# Patient Record
Sex: FEMALE | Race: BLACK OR AFRICAN AMERICAN | NOT HISPANIC OR LATINO | URBAN - METROPOLITAN AREA
[De-identification: names, ages, dates, MRNs, and addresses within clinical notes are randomized per-mention and may not be internally consistent; named-entity substitution may affect disease eponyms.]

---

## 2022-02-21 ENCOUNTER — OFFICE VISIT (OUTPATIENT)
Dept: SURGERY | Facility: CLINIC | Age: 47
End: 2022-02-21
Payer: MEDICAID

## 2022-02-21 VITALS — RESPIRATION RATE: 18 BRPM | WEIGHT: 250 LBS | BODY MASS INDEX: 39.24 KG/M2 | HEIGHT: 67 IN

## 2022-02-21 DIAGNOSIS — D17.21 LIPOMA OF RIGHT UPPER EXTREMITY: ICD-10-CM

## 2022-02-21 DIAGNOSIS — K43.6 VENTRAL HERNIA WITH OBSTRUCTION, WITHOUT GANGRENE: Primary | ICD-10-CM

## 2022-02-21 DIAGNOSIS — R23.0 CYANOSIS: ICD-10-CM

## 2022-02-21 PROCEDURE — 1159F MED LIST DOCD IN RCRD: CPT | Mod: CPTII,S$GLB,, | Performed by: SURGERY

## 2022-02-21 PROCEDURE — 1160F RVW MEDS BY RX/DR IN RCRD: CPT | Mod: CPTII,S$GLB,, | Performed by: SURGERY

## 2022-02-21 PROCEDURE — 99204 PR OFFICE/OUTPT VISIT, NEW, LEVL IV, 45-59 MIN: ICD-10-PCS | Mod: S$GLB,,, | Performed by: SURGERY

## 2022-02-21 PROCEDURE — 3008F BODY MASS INDEX DOCD: CPT | Mod: CPTII,S$GLB,, | Performed by: SURGERY

## 2022-02-21 PROCEDURE — 99204 OFFICE O/P NEW MOD 45 MIN: CPT | Mod: S$GLB,,, | Performed by: SURGERY

## 2022-02-21 PROCEDURE — 1159F PR MEDICATION LIST DOCUMENTED IN MEDICAL RECORD: ICD-10-PCS | Mod: CPTII,S$GLB,, | Performed by: SURGERY

## 2022-02-21 PROCEDURE — 3008F PR BODY MASS INDEX (BMI) DOCUMENTED: ICD-10-PCS | Mod: CPTII,S$GLB,, | Performed by: SURGERY

## 2022-02-21 PROCEDURE — 1160F PR REVIEW ALL MEDS BY PRESCRIBER/CLIN PHARMACIST DOCUMENTED: ICD-10-PCS | Mod: CPTII,S$GLB,, | Performed by: SURGERY

## 2022-02-21 NOTE — PROGRESS NOTES
Subjective:       Patient ID: Aranza Villatoro is a 46 y.o. female.    Chief Complaint: New Pt, R Forearm Mass, and Umbilical Hernia      46-year-old female with multiple complaints.  Patient has an umbilical hernia this started recently after doing some heavy lifting, she states she felt a pop and then a bulge.  Nothing making this better is only getting worse.    Patient is also complaining of a lump on the subcutaneous tissue right form, states has been getting bigger and causing some localized pain, like to have it removed.    Also complaining of left hand occasional numbness and tingling, as well as right hand numbness tingling and sometimes cyanosis    Review of Systems   Constitutional: Negative for chills and fever.   HENT: Negative for nasal congestion and rhinorrhea.    Eyes: Negative for discharge and visual disturbance.   Respiratory: Negative for shortness of breath and wheezing.    Cardiovascular: Negative for chest pain and palpitations.   Gastrointestinal: Negative.  Negative for abdominal distention, abdominal pain, anal bleeding, blood in stool, constipation, diarrhea, nausea, rectal pain and vomiting.   Endocrine: Negative for cold intolerance and heat intolerance.   Genitourinary: Negative for difficulty urinating and frequency.   Musculoskeletal: Negative for back pain and myalgias.   Integumentary:  Negative for pallor and rash.   Neurological: Negative for dizziness and headaches.   Hematological: Negative for adenopathy. Does not bruise/bleed easily.   Psychiatric/Behavioral: Negative for behavioral problems. The patient is not nervous/anxious.          Objective:      Physical Exam  Constitutional:       Appearance: Normal appearance. She is well-developed.   HENT:      Head: Normocephalic and atraumatic.   Eyes:      General: Lids are normal.      Conjunctiva/sclera: Conjunctivae normal.   Neck:      Trachea: Trachea normal.   Cardiovascular:      Rate and Rhythm: Normal rate and regular  rhythm.      Heart sounds: Normal heart sounds.   Pulmonary:      Effort: Pulmonary effort is normal.      Breath sounds: Normal breath sounds.   Abdominal:      General: Bowel sounds are normal. There is no distension.      Palpations: Abdomen is soft.      Tenderness: There is no abdominal tenderness.      Hernia: No hernia is present.          Comments: 2 cm reducible umbilical hernia   Musculoskeletal:      Cervical back: Normal range of motion.   Skin:     General: Skin is warm and dry.             Comments: 2 cm 2 cm right forearm lipoma   Neurological:      Mental Status: She is alert and oriented to person, place, and time.   Psychiatric:         Speech: Speech normal.         Behavior: Behavior normal. Behavior is cooperative.         Assessment:       Problem List Items Addressed This Visit    None     Visit Diagnoses     Ventral hernia with obstruction, without gangrene    -  Primary    Lipoma of right upper extremity              Plan:       46-year-old female with umbilical hernia, risks benefits robotic umbilical hernia repair discussed in detail, will be scheduled for surgery, at that time will also remove the lipoma the right arm.  Patient also having symptoms of possibly carpal tunnel bilaterally, having some cyanotic symptoms in her right hand which could be consistent with thoracic outlet syndrome.  Have the patient evaluated by the Orthopedics team.

## 2022-03-02 ENCOUNTER — TELEPHONE (OUTPATIENT)
Dept: SURGERY | Facility: CLINIC | Age: 47
End: 2022-03-02
Payer: MEDICAID

## 2022-03-02 NOTE — TELEPHONE ENCOUNTER
02-24-22 @ 12:04    FAXED (with conf) referral to Dr. Kd Hicks IV (ortho).    Dr. Kd Hicks IV  [facility]  P: [phone]  F: [phone]

## 2022-03-29 LAB
ABS NRBC COUNT: 0 THOU/UL (ref 0–0.01)
ABSOLUTE BASOPHIL: 0 10*3/UL (ref 0–0.3)
ABSOLUTE EOSINOPHIL: 0.3 10*3/UL (ref 0–0.6)
ABSOLUTE IMMATURE GRAN: 0.01 THOU/UL (ref 0–0.03)
ABSOLUTE LYMPHOCYTE: 2.3 10*3/UL (ref 1.2–4)
ABSOLUTE MONOCYTE: 0.3 10*3/UL (ref 0.1–0.8)
ANION GAP SERPL CALC-SCNC: 7 MMOL/L (ref 3–11)
APTT PPP: 34.2 SEC (ref 25.1–36.5)
BASOPHILS NFR BLD: 0.5 % (ref 0–3)
BUN SERPL-MCNC: 17 MG/DL (ref 7–18)
BUN/CREAT SERPL: 15.74 RATIO
CALCIUM SERPL-MCNC: 9.4 MG/DL (ref 8.5–10.1)
CHLORIDE SERPL-SCNC: 104 MMOL/L (ref 98–107)
CO2 SERPL-SCNC: 29 MMOL/L (ref 21–32)
CREAT SERPL-MCNC: 1.08 MG/DL (ref 0.55–1.02)
EOSINOPHIL NFR BLD: 5.1 % (ref 0–6)
ERYTHROCYTE [DISTWIDTH] IN BLOOD BY AUTOMATED COUNT: 14.9 % (ref 0–15.5)
GFR ESTIMATION: > 60
GLUCOSE SERPL-MCNC: 98 MG/DL (ref 74–106)
HCT VFR BLD AUTO: 40.5 % (ref 37–47)
HGB BLD-MCNC: 12.5 G/DL (ref 12–16)
IMMATURE GRANULOCYTES: 0.2 % (ref 0–0.43)
INR PPP: 0.9 INR (ref 0.9–1.1)
LYMPHOCYTES NFR BLD: 41.4 % (ref 20–45)
MCH RBC QN AUTO: 26 PG (ref 27–32)
MCHC RBC AUTO-ENTMCNC: 30.9 % (ref 32–36)
MCV RBC AUTO: 84.4 FL (ref 80–99)
MONOCYTES NFR BLD: 5.1 % (ref 2–10)
NEUTROPHILS # BLD AUTO: 2.6 10*3/UL (ref 1.4–7)
NEUTROPHILS NFR BLD: 47.7 % (ref 50–80)
NUCLEATED RED BLOOD CELLS: 0 % (ref 0–0.2)
PLATELETS: 256 10*3/UL (ref 130–400)
PMV BLD AUTO: 10.2 FL (ref 9.2–12.2)
POTASSIUM SERPL-SCNC: 4.2 MMOL/L (ref 3.5–5.1)
PROTHROMBIN TIME: 10.4 SEC (ref 10.2–12.9)
RBC # BLD AUTO: 4.8 10*6/UL (ref 4.2–5.4)
SODIUM BLD-SCNC: 140 MMOL/L (ref 131–143)
WBC # BLD: 5.5 10*3/UL (ref 4.5–10)

## 2022-03-31 ENCOUNTER — NURSE TRIAGE (OUTPATIENT)
Dept: ADMINISTRATIVE | Facility: CLINIC | Age: 47
End: 2022-03-31
Payer: MEDICAID

## 2022-03-31 ENCOUNTER — OUTSIDE PLACE OF SERVICE (OUTPATIENT)
Dept: SURGERY | Facility: CLINIC | Age: 47
End: 2022-03-31
Payer: MEDICAID

## 2022-03-31 LAB
B-HCG UR QL: NEGATIVE
SPECIFIC GRAVITY,UA,REFRACTOMETER: 1.02 (ref 1–1.03)

## 2022-03-31 PROCEDURE — 49652 PR LAP VENTRAL/UMBILICAL HERNIA; REDUCIBLE: ICD-10-PCS | Mod: ,,, | Performed by: SURGERY

## 2022-03-31 PROCEDURE — 24071 PR EXC TUMOR SOFT TISSUE UPPER ARM/ELBOW SUBQ 3+CM: ICD-10-PCS | Mod: 51,RT,, | Performed by: SURGERY

## 2022-03-31 PROCEDURE — 24071 EXC ARM/ELBOW LES SC 3 CM/>: CPT | Mod: 51,RT,, | Performed by: SURGERY

## 2022-03-31 PROCEDURE — 49652 PR LAP VENTRAL/UMBILICAL HERNIA; REDUCIBLE: CPT | Mod: ,,, | Performed by: SURGERY

## 2022-03-31 NOTE — TELEPHONE ENCOUNTER
Markia calling states she had hernia procedure today with Dr. Escobar, wants to know if ok to sleep in recliner since bed is high to get in and out off. Advised pt per triage protocol on call provider paged and if no call back within 30 minutes, call back or go to ED/UC for physician eval. Verbalized understanding.     Reason for Disposition   Nursing judgment   [1] Caller has URGENT question AND [2] triager unable to answer question    Additional Information   Negative: Sounds like a life-threatening emergency to the triager   Negative: [1] Widespread rash AND [2] bright red, sunburn-like   Negative: [1] SEVERE headache AND [2] after spinal (epidural) anesthesia   Negative: [1] Vomiting AND [2] persists > 4 hours   Negative: [1] Vomiting AND [2] abdomen looks much more swollen than usual   Negative: [1] Drinking very little AND [2] dehydration suspected (e.g., no urine > 12 hours, very dry mouth, very lightheaded)   Negative: Patient sounds very sick or weak to the triager   Negative: Sounds like a serious complication to the triager   Negative: Fever > 100.4 F (38.0 C)   Negative: [1] SEVERE post-op pain (e.g., excruciating, pain scale 8-10) AND [2] not controlled with pain medications    Protocols used: INFORMATION ONLY CALL - NO TRIAGE-A-OH, POST-OP SYMPTOMS AND [id number]

## 2022-03-31 NOTE — TELEPHONE ENCOUNTER
Updated Aranza regarding attempt to reach Dr. Escobar unsuccessful at this time and pt states she called hospital and wants to know if she is supposed to have abdominal wrap to help. Advised pt to go to ED/UC for physician eval of symptoms.

## 2022-04-01 ENCOUNTER — TELEPHONE (OUTPATIENT)
Dept: SURGERY | Facility: CLINIC | Age: 47
End: 2022-04-01
Payer: MEDICAID

## 2022-04-01 LAB — SPECIMEN TO PATHOLOGY: NORMAL

## 2022-04-01 NOTE — TELEPHONE ENCOUNTER
Spoke with pt regarding surgery post op.  Educated pt on abdominal wrap. Discuss with pt her concerns with having 5 incisions as opposed to 1, which she states is what the nurse that prepped her told her.  Informed pt typically 3 incisions are made, sometimes can be 4-5 depending on findings during surgery. Pt verbalized understanding.

## 2022-04-04 ENCOUNTER — OUTSIDE PLACE OF SERVICE (OUTPATIENT)
Dept: SURGERY | Facility: CLINIC | Age: 47
End: 2022-04-04
Payer: MEDICAID

## 2022-04-04 PROCEDURE — 99024 PR POST-OP FOLLOW-UP VISIT: ICD-10-PCS | Mod: ,,, | Performed by: SURGERY

## 2022-04-04 PROCEDURE — 99024 POSTOP FOLLOW-UP VISIT: CPT | Mod: ,,, | Performed by: SURGERY

## 2022-04-11 ENCOUNTER — OFFICE VISIT (OUTPATIENT)
Dept: SURGERY | Facility: CLINIC | Age: 47
End: 2022-04-11
Payer: MEDICAID

## 2022-04-11 VITALS — BODY MASS INDEX: 39.24 KG/M2 | RESPIRATION RATE: 18 BRPM | WEIGHT: 250 LBS | HEIGHT: 67 IN

## 2022-04-11 DIAGNOSIS — K43.6 VENTRAL HERNIA WITH OBSTRUCTION, WITHOUT GANGRENE: Primary | ICD-10-CM

## 2022-04-11 PROCEDURE — 99024 POSTOP FOLLOW-UP VISIT: CPT | Mod: S$GLB,,, | Performed by: SURGERY

## 2022-04-11 PROCEDURE — 3008F BODY MASS INDEX DOCD: CPT | Mod: CPTII,S$GLB,, | Performed by: SURGERY

## 2022-04-11 PROCEDURE — 1160F PR REVIEW ALL MEDS BY PRESCRIBER/CLIN PHARMACIST DOCUMENTED: ICD-10-PCS | Mod: CPTII,S$GLB,, | Performed by: SURGERY

## 2022-04-11 PROCEDURE — 3008F PR BODY MASS INDEX (BMI) DOCUMENTED: ICD-10-PCS | Mod: CPTII,S$GLB,, | Performed by: SURGERY

## 2022-04-11 PROCEDURE — 1160F RVW MEDS BY RX/DR IN RCRD: CPT | Mod: CPTII,S$GLB,, | Performed by: SURGERY

## 2022-04-11 PROCEDURE — 1159F PR MEDICATION LIST DOCUMENTED IN MEDICAL RECORD: ICD-10-PCS | Mod: CPTII,S$GLB,, | Performed by: SURGERY

## 2022-04-11 PROCEDURE — 99024 PR POST-OP FOLLOW-UP VISIT: ICD-10-PCS | Mod: S$GLB,,, | Performed by: SURGERY

## 2022-04-11 PROCEDURE — 1159F MED LIST DOCD IN RCRD: CPT | Mod: CPTII,S$GLB,, | Performed by: SURGERY

## 2022-04-11 RX ORDER — OMEPRAZOLE 40 MG/1
40 CAPSULE, DELAYED RELEASE ORAL DAILY
Qty: 30 CAPSULE | Refills: 11 | Status: SHIPPED | OUTPATIENT
Start: 2022-04-11 | End: 2023-04-11

## 2022-04-11 RX ORDER — METHYLPHENIDATE HYDROCHLORIDE 27 MG/1
27 TABLET ORAL EVERY MORNING
Qty: 14 TABLET | Refills: 0 | Status: CANCELLED | OUTPATIENT
Start: 2022-04-11 | End: 2022-04-25

## 2022-04-11 NOTE — PROGRESS NOTES
Patient postoperatively had a ileus constipation, was admitted and given a bowel regimen.  She was able to have several bowel movements and was eventually discharged.  This time she is having complaints of some epigastric burning with certain acidic foods.  States that she has reflux on occasion and is in taking anything for that    All incisions healing well with no signs of recurrence    Will give the patient some omeprazole as well as Carafate and have the patient follow-up in couple weeks to re-evaluate her epigastric abdominal pain.

## 2022-04-11 NOTE — LETTER
April 11, 2022    Aranza Villatoro  [address]  [place] [address]             [facility]  General Surgery  [address] [address]  Phone: [phone]  Fax: [phone]   April 11, 2022     Patient: Aranza Villatoro   YOB: 1975   Date of Visit: 4/11/2022       To Whom it May Concern:    Aranza Villatoro was seen in my clinic on 4/11/2022. She may return to work on 04/18/2022 on full duty.    Please excuse her from any work missed.    If you have any questions or concerns, please don't hesitate to call.    Sincerely,                 Delonte Escobar, DO

## 2022-04-11 NOTE — LETTER
April 11, 2022    Aranza Villatoro  [address]  [place] [address]             [facility]  General Surgery  [address] [address]  Phone: [phone]  Fax: [phone]   April 11, 2022     Patient: Aranza Villatoro   YOB: 1975   Date of Visit: 4/11/2022       To Whom it May Concern:    Aranza Villatoro was seen in my clinic on 4/11/2022. She may return to work on 04/18/2022 on light duty.    Please excuse her from any work missed.    If you have any questions or concerns, please don't hesitate to call.    Sincerely,                     Delonte Escobar, DO

## 2022-11-23 ENCOUNTER — PATIENT MESSAGE (OUTPATIENT)
Dept: SURGERY | Facility: CLINIC | Age: 47
End: 2022-11-23
Payer: MEDICAID

## 2022-12-21 ENCOUNTER — OFFICE VISIT (OUTPATIENT)
Dept: SURGERY | Facility: CLINIC | Age: 47
End: 2022-12-21
Payer: MEDICAID

## 2022-12-21 ENCOUNTER — TELEPHONE (OUTPATIENT)
Dept: SURGERY | Facility: CLINIC | Age: 47
End: 2022-12-21

## 2022-12-21 DIAGNOSIS — K59.09 OTHER CONSTIPATION: Primary | ICD-10-CM

## 2022-12-21 PROCEDURE — 99213 OFFICE O/P EST LOW 20 MIN: CPT | Mod: S$GLB,,, | Performed by: SURGERY

## 2022-12-21 PROCEDURE — 1160F PR REVIEW ALL MEDS BY PRESCRIBER/CLIN PHARMACIST DOCUMENTED: ICD-10-PCS | Mod: CPTII,S$GLB,, | Performed by: SURGERY

## 2022-12-21 PROCEDURE — 99213 PR OFFICE/OUTPT VISIT, EST, LEVL III, 20-29 MIN: ICD-10-PCS | Mod: S$GLB,,, | Performed by: SURGERY

## 2022-12-21 PROCEDURE — 1160F RVW MEDS BY RX/DR IN RCRD: CPT | Mod: CPTII,S$GLB,, | Performed by: SURGERY

## 2022-12-21 PROCEDURE — 1159F PR MEDICATION LIST DOCUMENTED IN MEDICAL RECORD: ICD-10-PCS | Mod: CPTII,S$GLB,, | Performed by: SURGERY

## 2022-12-21 PROCEDURE — 1159F MED LIST DOCD IN RCRD: CPT | Mod: CPTII,S$GLB,, | Performed by: SURGERY

## 2022-12-21 NOTE — TELEPHONE ENCOUNTER
Pt given colonoscopy packet w/ instructions. Pt states she will call back to schedule colonoscopy. Pt states she will be on vacation and doesn't know when she will return.

## 2022-12-21 NOTE — PROGRESS NOTES
Subjective:       Patient ID: Aranza Villatoro is a 47 y.o. female.    Chief Complaint: Pain (Abdominal pain across mid abd and constipation.)      This is a 47-year-old female robotic ventral hernia repair several months ago patient states that he started periumbilical and bilateral lower quadrant pain, also suffering from constipation which she states she has had in past.  Patient has never had a colonoscopy but did have a abdominal ultrasound which demonstrated a ovarian cyst.    Review of Systems   Constitutional:  Negative for chills and fever.   HENT:  Negative for nasal congestion and rhinorrhea.    Eyes:  Negative for discharge and visual disturbance.   Respiratory:  Negative for shortness of breath and wheezing.    Cardiovascular:  Negative for chest pain and palpitations.   Gastrointestinal: Negative.  Negative for abdominal distention, abdominal pain, anal bleeding, blood in stool, constipation, diarrhea, nausea, rectal pain and vomiting.   Endocrine: Negative for cold intolerance and heat intolerance.   Genitourinary:  Negative for difficulty urinating and frequency.   Musculoskeletal:  Negative for back pain and myalgias.   Integumentary:  Negative for pallor and rash.   Neurological:  Negative for dizziness and headaches.   Hematological:  Negative for adenopathy. Does not bruise/bleed easily.   Psychiatric/Behavioral:  Negative for behavioral problems. The patient is not nervous/anxious.        Objective:      Physical Exam  Constitutional:       Appearance: Normal appearance. She is well-developed.   HENT:      Head: Normocephalic and atraumatic.   Eyes:      General: Lids are normal.      Conjunctiva/sclera: Conjunctivae normal.   Neck:      Trachea: Trachea normal.   Cardiovascular:      Rate and Rhythm: Normal rate and regular rhythm.      Heart sounds: Normal heart sounds.   Pulmonary:      Effort: Pulmonary effort is normal.      Breath sounds: Normal breath sounds.   Abdominal:      General: Bowel  sounds are normal. There is no distension.      Palpations: Abdomen is soft.      Tenderness: There is no abdominal tenderness.      Hernia: No hernia is present.   Musculoskeletal:      Cervical back: Normal range of motion.   Skin:     General: Skin is warm and dry.   Neurological:      Mental Status: She is alert and oriented to person, place, and time.   Psychiatric:         Speech: Speech normal.         Behavior: Behavior normal. Behavior is cooperative.       Assessment:       Problem List Items Addressed This Visit    None  Visit Diagnoses       Other constipation    -  Primary              Plan:       47-year-old female with abdominal pain constipation, patient will be set up for a diagnostic colonoscopy, risks and benefits discussed with the patient in detail.